# Patient Record
Sex: MALE | Race: WHITE | NOT HISPANIC OR LATINO
[De-identification: names, ages, dates, MRNs, and addresses within clinical notes are randomized per-mention and may not be internally consistent; named-entity substitution may affect disease eponyms.]

---

## 2020-06-04 PROBLEM — Z00.00 ENCOUNTER FOR PREVENTIVE HEALTH EXAMINATION: Status: ACTIVE | Noted: 2020-06-04

## 2020-06-08 ENCOUNTER — APPOINTMENT (OUTPATIENT)
Dept: ENDOCRINOLOGY | Facility: CLINIC | Age: 45
End: 2020-06-08
Payer: COMMERCIAL

## 2020-06-08 VITALS
OXYGEN SATURATION: 97 % | TEMPERATURE: 98.2 F | WEIGHT: 150 LBS | BODY MASS INDEX: 22.22 KG/M2 | DIASTOLIC BLOOD PRESSURE: 70 MMHG | SYSTOLIC BLOOD PRESSURE: 118 MMHG | HEIGHT: 69 IN | HEART RATE: 71 BPM

## 2020-06-08 DIAGNOSIS — Z78.9 OTHER SPECIFIED HEALTH STATUS: ICD-10-CM

## 2020-06-08 DIAGNOSIS — Z86.39 PERSONAL HISTORY OF OTHER ENDOCRINE, NUTRITIONAL AND METABOLIC DISEASE: ICD-10-CM

## 2020-06-08 DIAGNOSIS — E04.1 NONTOXIC SINGLE THYROID NODULE: ICD-10-CM

## 2020-06-08 PROCEDURE — 99204 OFFICE O/P NEW MOD 45 MIN: CPT

## 2020-06-08 RX ORDER — LANSOPRAZOLE 30 MG/1
30 CAPSULE, DELAYED RELEASE ORAL
Refills: 0 | Status: ACTIVE | COMMUNITY

## 2020-06-08 NOTE — ASSESSMENT
[FreeTextEntry1] : 45 year old man with complex cystic right thyroid nodule\par \par 1. Thyroid nodule:\par - overall, appears clinically euthyroid\par - he was biochemically euthyroid on TFTs form 5/2020\par - reviewed thyroid ultrasound report with patient and his wife today, will obtain images as well\par - discussed usual course of thyroid nodules and the relative risks of malignancy given their initial size and characteristics\par - discussed option of FNA, given size and complex nature of cystic nodule (not purely cystic) - he has elected to proceed with FNA\par - if benign, and continues to have significant neck discomfort from nodule, recommended he discuss with ENT possibility of partial resection\par \par Return visit in 6 months

## 2020-06-08 NOTE — PHYSICAL EXAM
[Alert] : alert [No Acute Distress] : no acute distress [Well Nourished] : well nourished [Healthy Appearance] : healthy appearance [Normal Sclera/Conjunctiva] : normal sclera/conjunctiva [No Neck Mass] : no neck mass was observed [No Proptosis] : no proptosis [Supple] : the neck was supple [No Accessory Muscle Use] : no accessory muscle use [No Respiratory Distress] : no respiratory distress [Normal Rate and Effort] : normal respiratory rate and effort [Clear to Auscultation] : lungs were clear to auscultation bilaterally [Normal Rate] : heart rate was normal [Normal S1, S2] : normal S1 and S2 [No Edema] : no peripheral edema [Regular Rhythm] : with a regular rhythm [Normal Bowel Sounds] : normal bowel sounds [Not Tender] : non-tender [Not Distended] : not distended [No Masses] : no abdominal mass palpated [Spine Straight] : spine straight [Soft] : abdomen soft [Normal Gait] : normal gait [No Clubbing, Cyanosis] : no clubbing  or cyanosis of the fingernails [No Involuntary Movements] : no involuntary movements were seen [No Motor Deficits] : the motor exam was normal [Oriented x3] : oriented to person, place, and time [No Tremors] : no tremors [Normal Insight/Judgement] : insight and judgment were intact [Normal Affect] : the affect was normal [Normal Mood] : the mood was normal [Kyphosis] : no kyphosis present [Abdominal Striae] : no abdominal striae [Acanthosis Nigricans] : no acanthosis nigricans [de-identified] : + right lower pole nodule

## 2020-06-08 NOTE — REVIEW OF SYSTEMS
[Recent Weight Loss (___ Lbs)] : recent weight loss: [unfilled] lbs [Dysphagia] : dysphagia [Neck Pain] : neck pain [Cough] : cough [All other systems negative] : All other systems negative [Diarrhea] : diarrhea [Headaches] : headaches [Fatigue] : no fatigue [Recent Weight Gain (___ Lbs)] : no recent weight gain [Fever] : no fever [Chills] : no chills [Dysphonia] : no dysphonia [Chest Pain] : no chest pain [Lower Ext Edema] : no lower extremity edema [Palpitations] : no palpitations [Nausea] : no nausea [Shortness Of Breath] : no shortness of breath [Constipation] : no constipation [Abdominal Pain] : no abdominal pain [Vomiting] : no vomiting [Dry Skin] : no dry skin [Hair Loss] : no hair loss [Tremors] : no tremors [Heat Intolerance] : no heat intolerance [Cold Intolerance] : no cold intolerance

## 2020-06-08 NOTE — HISTORY OF PRESENT ILLNESS
[FreeTextEntry1] : chief complaint: thyroid nodule\par \par Patient is a 45 year old man with PMH of GERD here for evaluation of thyroid nodule. For the last 6 months, he has had intermittent neck pain. He noticed a lump in his neck a few weeks ago, went to an urgent care center and was referred to ENT. A thyroid ultrasound was performed in 5/2020, which revealed the following: 2.9 x 1.8 x 2.2 cm complex, predominantly cystic lesion with septations and internal tiny echogenic foci. He was advised by ENT to undergo FNA and was also prescribed an antibiotic with short 5 day course of steroids. Neck pain did improve with steroids. TSH was 1.28 with free T4 of 1.4 on 5/22/2020. \par \par He has never had surgery or RT to the neck. No history of amiodarone or lithium use. He still has occasional neck pain, dysphagia. No dysphonia. No chest pain, palpitations, abdominal pain, nausea, vomiting, constipation. He has occasional diarrhea. Occasional headaches. No skin or hair changes, no heat or cold intolerance. Has not had a biopsy of the nodule. \par \par He is accompanied by his wife today, who is also a patient of this practice.

## 2020-07-23 ENCOUNTER — APPOINTMENT (OUTPATIENT)
Dept: ENDOCRINOLOGY | Facility: CLINIC | Age: 45
End: 2020-07-23
Payer: COMMERCIAL

## 2020-07-23 VITALS
BODY MASS INDEX: 22.22 KG/M2 | SYSTOLIC BLOOD PRESSURE: 110 MMHG | HEART RATE: 88 BPM | HEIGHT: 69 IN | TEMPERATURE: 98.3 F | DIASTOLIC BLOOD PRESSURE: 70 MMHG | OXYGEN SATURATION: 98 % | WEIGHT: 150 LBS

## 2020-07-23 PROCEDURE — 10005 FNA BX W/US GDN 1ST LES: CPT

## 2020-07-23 NOTE — PROCEDURE
[Fine Needle Aspiration] : Fine needle aspiration ~T ~C was performed. [Area of Mass: ______] : mass identified in the [unfilled] [Patient] : the patient [Risks] : risks [Alcohol] : with alcohol [Supine] : The patient was placed in the supine position with the neck extended as tolerated. [Benefits] : benefits [25 gauge 1.5 inch] : A 25 gauge 1.5 inch needle was used [2 Passes] : 2 passes were made through the mass [Ultrasonic Guidance] : ultrasound guidance was employed [Sent to Histology] : The specimens were prepared in the usual manner and sent to histology. [Afirma] : Afirma [Tolerated Well] : the patient tolerated the procedure well [Instructions Given] : Handouts/patient instructions were given to patient [No Complications] : There were no complications [FreeTextEntry1] : 45 year old male with right upper pole nodule 1.7 cm ( mixed solid cystic ) s/p FNA\par \par -F/u on results\par -Saved for affirma \par -F/u with Dr. Prajapati

## 2020-07-24 LAB — FNA, THYROID: NORMAL

## 2020-11-28 ENCOUNTER — EMERGENCY (EMERGENCY)
Facility: HOSPITAL | Age: 45
LOS: 1 days | Discharge: HOME | End: 2020-11-28
Attending: STUDENT IN AN ORGANIZED HEALTH CARE EDUCATION/TRAINING PROGRAM
Payer: COMMERCIAL

## 2020-11-28 VITALS
OXYGEN SATURATION: 99 % | DIASTOLIC BLOOD PRESSURE: 70 MMHG | TEMPERATURE: 98 F | SYSTOLIC BLOOD PRESSURE: 122 MMHG | HEART RATE: 77 BPM | RESPIRATION RATE: 18 BRPM

## 2020-11-28 VITALS
RESPIRATION RATE: 19 BRPM | HEART RATE: 74 BPM | SYSTOLIC BLOOD PRESSURE: 121 MMHG | HEIGHT: 69 IN | WEIGHT: 149.91 LBS | DIASTOLIC BLOOD PRESSURE: 71 MMHG | OXYGEN SATURATION: 98 % | TEMPERATURE: 98 F

## 2020-11-28 PROCEDURE — 99283 EMERGENCY DEPT VISIT LOW MDM: CPT

## 2020-11-28 PROCEDURE — 73564 X-RAY EXAM KNEE 4 OR MORE: CPT | Mod: 26,LT

## 2020-11-28 PROCEDURE — 73564 X-RAY EXAM KNEE 4 OR MORE: CPT

## 2020-11-28 RX ORDER — IBUPROFEN 200 MG
600 TABLET ORAL ONCE
Refills: 0 | Status: COMPLETED | OUTPATIENT
Start: 2020-11-28 | End: 2020-11-28

## 2020-11-28 RX ORDER — HYDROCORTISONE 1 %
1 OINTMENT (GRAM) TOPICAL
Qty: 1 | Refills: 0
Start: 2020-11-28 | End: 2020-11-30

## 2020-11-28 RX ADMIN — Medication 600 MILLIGRAM(S): at 16:30

## 2020-11-28 RX ADMIN — Medication 600 MILLIGRAM(S): at 16:39

## 2020-11-28 NOTE — ED PROVIDER NOTE - NSFOLLOWUPINSTRUCTIONS_ED_ALL_ED_FT
You were seen today for left knee pain. You did not have a fracture on your xray.     For pain control, you should rest, elevate your leg at night when you sleep, wrap your knee when you are walking to improve stability and control your pain:   Apply ice to the area as needed.   Take ibuprofen 600 mg every 8 hours as needed for pain with food and plenty of water for up to 5 days  Take tylenol 650 mg every 6 hours as needed for pain, max daily dose 3250 mg/day.     Follow up with an orthopedist.     Return to the emergency department for if you develop any fevers or inability to walk, weakness/numbness, or if you have any new or changing symptoms or concerns.

## 2020-11-28 NOTE — ED PROVIDER NOTE - PHYSICAL EXAMINATION
Gen: AAOx3, NAD  Head: NCAT  ENT: Airway patent, moist mucous membranes   Cardiac: Normal rate, normal rhythm, no murmurs   Respiratory: Lungs CTA B/L  Gastrointestinal: Abdomen nondistended  MSK: No gross abnormalities, FROM of all four extremities, no edema, + TTP L lateral knee at distal femur near joint line, no patellar ttp, no joint laxity appreciated, + pain with valgus stress testing of L knee. 2+ distal DP pulses,. Normal sensation. FROM L knee intact. No lower tib/fib or ankle pain appreciated. Pt ambulatory.   HEME: Extremities warm, pulses intact   Skin: No rashes, no lesions,  Neuro: No gross neurologic deficits

## 2020-11-28 NOTE — ED PROVIDER NOTE - NSFOLLOWUPCLINICS_GEN_ALL_ED_FT
[facility] Orthopedic Surgery  Orthopedic Surgery  [address], [address]rd & [address]th [place] [address]  Phone: [phone]  Fax:   Follow Up Time: 4-6 Days    [facility] Sports Medicine  Sports Medicine  [address] [place] [id number]  Phone: [phone]  Fax:   Follow Up Time: 4-6 Days

## 2020-11-28 NOTE — ED PROVIDER NOTE - OBJECTIVE STATEMENT
45M no pmx p/w lateral L knee pain x 1 week, states he twisted on the knee to the left then fell on it, since then has had 4-5/10 pain at rest and worse with walking, has intermittently tried OTC pain meds but not consistently. No athletic activity such as running. No paresthesias. No fevers. No other complaints today. 45M no pmx p/w lateral L knee pain x 1 week. States he twisted on the knee to the left then fell on it 1 wk ago, since then has had 4-5/10 dull pain at rest and worse with walking, has intermittently tried OTC pain meds but not consistently. No athletic activity such as running. No paresthesias. No fevers. No other complaints today.

## 2020-11-28 NOTE — ED PROVIDER NOTE - CLINICAL SUMMARY MEDICAL DECISION MAKING FREE TEXT BOX
Catherine DO: 45M w/ traumatic L knee pain x 1 week, will trial supportive care, xr rule out fx, no significant effusion/ jt laxity/ neurovasc deficit appreciated, suspect lateral meniscus injury vs knee sprain. RICE therapy, if xr wnl can dc w/ ortho f/u

## 2020-11-28 NOTE — ED PROVIDER NOTE - PATIENT PORTAL LINK FT
You can access the FollowMyHealth Patient Portal offered by [facility] by registering at the following website: [email or web address]. By joining [facility]’s FollowMyHealth portal, you will also be able to view your health information using other applications (apps) compatible with our system.

## 2020-11-28 NOTE — ED PROVIDER NOTE - NS ED ROS FT
Gen: No fever, normal appetite  Eyes: No eye irritation or discharge  ENT: No ear pain, congestion, sore throat  Resp: No cough or trouble breathing  Cardiovascular: No chest pain or palpitation  Gastroenteric: No nausea/vomiting   :  No change in urine output; no dysuria  MS:+ L knee pain   Skin: No rashes  Neuro: No headache; no abnormal movements  Remainder negative, except as per the HPI

## 2020-11-28 NOTE — ED ADULT NURSE NOTE - OBJECTIVE STATEMENT
pt twisted his left knee 5 days ago.  he is here without taking motrin/tylenol because "it hurts when I walk"  knee is stiff in the morning but pt has a steady gait.

## 2020-12-02 ENCOUNTER — APPOINTMENT (OUTPATIENT)
Dept: DERMATOLOGY | Facility: CLINIC | Age: 45
End: 2020-12-02
Payer: COMMERCIAL

## 2020-12-02 VITALS — BODY MASS INDEX: 22.22 KG/M2 | HEIGHT: 69 IN | WEIGHT: 150 LBS

## 2020-12-02 VITALS — WEIGHT: 150 LBS | BODY MASS INDEX: 22.22 KG/M2 | HEIGHT: 69 IN

## 2020-12-02 VITALS — TEMPERATURE: 96.6 F

## 2020-12-02 DIAGNOSIS — D48.9 NEOPLASM OF UNCERTAIN BEHAVIOR, UNSPECIFIED: ICD-10-CM

## 2020-12-02 DIAGNOSIS — L25.5 UNSPECIFIED CONTACT DERMATITIS DUE TO PLANTS, EXCEPT FOOD: ICD-10-CM

## 2020-12-02 PROCEDURE — 99203 OFFICE O/P NEW LOW 30 MIN: CPT

## 2020-12-02 PROCEDURE — 99072 ADDL SUPL MATRL&STAF TM PHE: CPT

## 2020-12-02 RX ORDER — BETAMETHASONE DIPROPIONATE 0.5 MG/G
0.05 OINTMENT TOPICAL TWICE DAILY
Qty: 1 | Refills: 3 | Status: ACTIVE | COMMUNITY
Start: 2020-12-02 | End: 1900-01-01

## 2020-12-08 ENCOUNTER — APPOINTMENT (OUTPATIENT)
Dept: ENDOCRINOLOGY | Facility: CLINIC | Age: 45
End: 2020-12-08

## 2020-12-14 PROBLEM — Z78.9 OTHER SPECIFIED HEALTH STATUS: Chronic | Status: ACTIVE | Noted: 2020-11-30

## 2021-01-06 ENCOUNTER — APPOINTMENT (OUTPATIENT)
Dept: PULMONOLOGY | Facility: CLINIC | Age: 46
End: 2021-01-06
Payer: COMMERCIAL

## 2021-01-06 VITALS
SYSTOLIC BLOOD PRESSURE: 125 MMHG | RESPIRATION RATE: 16 BRPM | TEMPERATURE: 98.1 F | DIASTOLIC BLOOD PRESSURE: 71 MMHG | HEART RATE: 68 BPM | OXYGEN SATURATION: 96 % | HEIGHT: 69 IN | BODY MASS INDEX: 23.55 KG/M2 | WEIGHT: 159 LBS

## 2021-01-06 VITALS — HEART RATE: 70 BPM | HEIGHT: 69 IN | WEIGHT: 159 LBS | BODY MASS INDEX: 23.55 KG/M2 | TEMPERATURE: 98.2 F

## 2021-01-06 DIAGNOSIS — R05 COUGH: ICD-10-CM

## 2021-01-06 PROCEDURE — 94726 PLETHYSMOGRAPHY LUNG VOLUMES: CPT

## 2021-01-06 PROCEDURE — ZZZZZ: CPT

## 2021-01-06 PROCEDURE — 94729 DIFFUSING CAPACITY: CPT

## 2021-01-06 PROCEDURE — 99203 OFFICE O/P NEW LOW 30 MIN: CPT | Mod: 25

## 2021-01-06 PROCEDURE — 94010 BREATHING CAPACITY TEST: CPT

## 2021-01-06 PROCEDURE — 99072 ADDL SUPL MATRL&STAF TM PHE: CPT

## 2021-01-06 NOTE — HISTORY OF PRESENT ILLNESS
[TextBox_4] : 45-year-old male here for initial consultation.  Chronic cough.\par \par He reports the cough started about 1 to 2 years ago.  He does not notice any specific triggering event.  Cough is not constant, some days it occurs some days it does not.  More frequently occurs at night when lying down.  He reports that he has been seen by an ENT, last time about 6 months ago, and was diagnosed with what sounds like laryngopharyngeal reflux secondary to "acid."  He was started on medications, including Prevacid.  Does feel that it helped while he took it, and has come back since he stopped taking it.  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

## 2021-01-06 NOTE — DISCUSSION/SUMMARY
[FreeTextEntry1] : 45-year-old male, non-smoker, but with occupational exposures, chronic cough with no distinct inciting event.  ENT had diagnosed him with LPR, cough had improved when he was taking some form of PPI, and also notes that it has come back since he stopped taking it.  This is certainly most consistent diagnosis.\par \par His pulmonary function test is completely normal.\par \par Ordering a chest x-ray chest is baseline, given chronicity.  Otherwise, he will follow up with ENT and GI

## 2021-01-07 ENCOUNTER — OUTPATIENT (OUTPATIENT)
Dept: OUTPATIENT SERVICES | Facility: HOSPITAL | Age: 46
LOS: 1 days | End: 2021-01-07
Payer: COMMERCIAL

## 2021-01-07 ENCOUNTER — APPOINTMENT (OUTPATIENT)
Dept: RADIOLOGY | Facility: IMAGING CENTER | Age: 46
End: 2021-01-07
Payer: COMMERCIAL

## 2021-01-07 DIAGNOSIS — R05 COUGH: ICD-10-CM

## 2021-01-07 PROCEDURE — 71046 X-RAY EXAM CHEST 2 VIEWS: CPT | Mod: 26

## 2021-01-07 PROCEDURE — 71046 X-RAY EXAM CHEST 2 VIEWS: CPT

## 2021-01-12 ENCOUNTER — NON-APPOINTMENT (OUTPATIENT)
Age: 46
End: 2021-01-12

## 2021-02-10 ENCOUNTER — APPOINTMENT (OUTPATIENT)
Dept: OTOLARYNGOLOGY | Facility: CLINIC | Age: 46
End: 2021-02-10

## 2022-01-12 ENCOUNTER — APPOINTMENT (OUTPATIENT)
Dept: DERMATOLOGY | Facility: CLINIC | Age: 47
End: 2022-01-12
Payer: COMMERCIAL

## 2022-01-12 DIAGNOSIS — D18.01 HEMANGIOMA OF SKIN AND SUBCUTANEOUS TISSUE: ICD-10-CM

## 2022-01-12 DIAGNOSIS — D22.9 MELANOCYTIC NEVI, UNSPECIFIED: ICD-10-CM

## 2022-01-12 DIAGNOSIS — C44.90 UNSPECIFIED MALIGNANT NEOPLASM OF SKIN, UNSPECIFIED: ICD-10-CM

## 2022-01-12 PROCEDURE — 99213 OFFICE O/P EST LOW 20 MIN: CPT

## 2022-01-14 RX ORDER — KETOCONAZOLE 20 MG/G
2 CREAM TOPICAL TWICE DAILY
Qty: 1 | Refills: 1 | Status: ACTIVE | COMMUNITY
Start: 2022-01-12

## 2022-02-02 ENCOUNTER — APPOINTMENT (OUTPATIENT)
Dept: DERMATOLOGY | Facility: CLINIC | Age: 47
End: 2022-02-02
Payer: COMMERCIAL

## 2022-02-02 DIAGNOSIS — L30.9 DERMATITIS, UNSPECIFIED: ICD-10-CM

## 2022-02-02 PROCEDURE — 99214 OFFICE O/P EST MOD 30 MIN: CPT

## 2022-02-02 RX ORDER — CLOBETASOL PROPIONATE 0.5 MG/G
0.05 OINTMENT TOPICAL
Qty: 1 | Refills: 1 | Status: ACTIVE | COMMUNITY
Start: 2022-02-02 | End: 1900-01-01

## 2022-09-21 NOTE — ED ADULT TRIAGE NOTE - BP NONINVASIVE SYSTOLIC (MM HG)
[address] Complex Repair Preamble Text (Leave Blank If You Do Not Want): Extensive wide undermining was performed.

## 2024-12-12 NOTE — ED ADULT NURSE NOTE - NS ED NURSE LEVEL OF CONSCIOUSNESS ORIENTATION
Conjuntivae and eyelids appear normal, Sclerae : White without injection Oriented - self; Oriented - place; Oriented - time

## 2025-02-02 ENCOUNTER — NON-APPOINTMENT (OUTPATIENT)
Age: 50
End: 2025-02-02

## 2025-07-24 ENCOUNTER — NON-APPOINTMENT (OUTPATIENT)
Age: 50
End: 2025-07-24

## 2025-08-26 ENCOUNTER — APPOINTMENT (OUTPATIENT)
Dept: INTERNAL MEDICINE | Facility: CLINIC | Age: 50
End: 2025-08-26